# Patient Record
(demographics unavailable — no encounter records)

---

## 2024-10-23 NOTE — ASSESSMENT
[FreeTextEntry1] : 90F HTN, HLD, and reported asthma presenting for initial pulmonary evaluation.  #Asthma - PFTs reviewed with obstruction and significant bronchodilator response - Will add Prednisone 20mg daily x 7 days - Will start Breztri BID - sample provided in office and patient aware to rinse mouth after use - Continue Albuterol PRN - Depending on response to current therapy will potentially consider serologic workup for further evalution - Check CXR to evaluate for potential parenchymal lung disease  #Health Maintenance Spirometry: Reviewed  Smoking status: Never smoker  Pneumococcal vaccination status: Will need prior records [place] Sleepiness Scale: Not a sleep visit  #Follow-up in 1 month or sooner as needed - All questions answered  The above plan was discussed with SCOTT HARDEN  in detail. Patient verbalized understanding and agrees with plan as detailed above. Patient was provided education and counselling on current diagnosis/symptoms, diagnostic work up, treatment options and potential side effects of any prescribed therapy/therapies. SCOTT  was advised to call our clinic at [phone] for any new or worsening symptoms, or with any questions or concerns. In case of acute onset of respiratory symptoms or worsening presentation, patient was advised to present to nearest emergency room for further evaluation. SCOTT  expressed understanding and all questions/concerns were addressed.

## 2024-10-23 NOTE — HISTORY OF PRESENT ILLNESS
[TextBox_4] : 90F HTN, HLD, and reported asthma presenting for initial pulmonary evaluation. - She comes to the hospital with her caregiver Brigitte today. She is in good spirits today and denies any major concerns  She has a long history of asthma and presently takes Albuterol 4 times a day with improvement in her wheezing. She notes the wheezing is worse at nighttime. She has a cough without sputum production. She has not had any hemoptysis. SHe has not had any recent treatments with Prednisone or antibiotics. - She does not follow with a pulmonary physician and her medications are renewed by her PMD. Generally she does ok and notes a worsening of symptoms during the fall and winter time.  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 She has 12 children and multiple grandchildren. She has been in the [place] for > 50 years. She does not have any pets. She does not have any carpets in her house. She denies any recent hospitalizations or urgent care visits.  She denies fevers, chills, or night sweats. She denies weight loss. She denies limitation to her activity but does note that she is mostly sedentary and enjoys watching tv. - She comes to the office walking with a cane.   PFTs: 10/22/24 - FEV1 0.83L (68%), FVC 1.72L (111%), FEV1/FVC 48%, FEF 25-75 30%, TLC 93%, RV 79%, RV/TLC 50%, DLCO 56% - POSITIVE BD RESPONSE (16% FEV1 and 15% FVC)

## 2024-10-23 NOTE — PHYSICAL EXAM
[No Acute Distress] : no acute distress [Well Nourished] : well nourished [Well Groomed] : well groomed [No Deformities] : no deformities [Well Developed] : well developed [Normal Oropharynx] : normal oropharynx [Normal Appearance] : normal appearance [No Neck Mass] : no neck mass [Normal Rate/Rhythm] : normal rate/rhythm [Normal S1, S2] : normal s1, s2 [No Resp Distress] : no resp distress [Benign] : benign [Soft] : soft [Normal Gait] : normal gait [No Clubbing] : no clubbing [No Cyanosis] : no cyanosis [No Edema] : no edema [FROM] : FROM [Normal Color/ Pigmentation] : normal color/ pigmentation [No Focal Deficits] : no focal deficits [No Motor Deficits] : no motor deficits [Oriented x3] : oriented x3 [Normal Mood] : normal mood [Normal Affect] : normal affect [No Acc Muscle Use] : no acc muscle use [Normal Rhythm and Effort] : normal rhythm and effort [Wheeze] : wheeze [TextBox_11] : NCAT, EOMI, anicteric, MMM, nares clear, no thrush [TextBox_68] : faint expiratory wheeze R lung base

## 2024-10-23 NOTE — ASSESSMENT
[FreeTextEntry1] : 90F HTN, HLD, and reported asthma presenting for initial pulmonary evaluation.  #Asthma - PFTs reviewed with obstruction and significant bronchodilator response - Will add Prednisone 20mg daily x 7 days - Will start Breztri BID - sample provided in office and patient aware to rinse mouth after use - Continue Albuterol PRN - Depending on response to current therapy will potentially consider serologic workup for further evalution - Check CXR to evaluate for potential parenchymal lung disease  #Health Maintenance Spirometry: Reviewed  Smoking status: Never smoker  Pneumococcal vaccination status: Will need prior records [place] Sleepiness Scale: Not a sleep visit  #Follow-up in 1 month or sooner as needed - All questions answered  The above plan was discussed with SCOTT HARDEN  in detail. Patient verbalized understanding and agrees with plan as detailed above. Patient was provided education and counselling on current diagnosis/symptoms, diagnostic work up, treatment options and potential side effects of any prescribed therapy/therapies. SCOTT  was advised to call our clinic at [phone] for any new or worsening symptoms, or with any questions or concerns. In case of acute onset of respiratory symptoms or worsening presentation, patient was advised to present to nearest emergency room for further evaluation. SCOTT  expressed understanding and all questions/concerns were addressed.

## 2024-10-23 NOTE — REVIEW OF SYSTEMS
[Cough] : cough [Wheezing] : wheezing [SOB on Exertion] : sob on exertion [Arthralgias] : arthralgias [Fever] : no fever [Fatigue] : no fatigue [Chills] : no chills [Dry Eyes] : no dry eyes [Sore Throat] : no sore throat [Eye Irritation] : no eye irritation [Nasal Congestion] : no nasal congestion [Postnasal Drip] : no postnasal drip [Hemoptysis] : no hemoptysis [Chest Tightness] : no chest tightness [Frequent URIs] : no frequent URIs [Sputum] : no sputum [Dyspnea] : no dyspnea [Edema] : no edema [Leg Cramps] : no leg cramps [Itchy Eyes] : no itchy eyes [Seasonal Allergies] : no seasonal allergies [Abdominal Pain] : no abdominal pain [Nausea] : no nausea [Vomiting] : no vomiting [Myalgias] : no myalgias [Headache] : no headache [Focal Weakness] : no focal weakness [Seizures] : no seizures [Head Injury] : no head injury [Anxiety] : no anxiety [Diabetes] : no diabetes [Thyroid Problem] : no thyroid problem [Obesity] : no obesity

## 2025-04-28 NOTE — IMAGING
[Bilateral] : hand bilaterally [There are no fractures, subluxations or dislocations. No significant abnormalities are seen] : There are no fractures, subluxations or dislocations. No significant abnormalities are seen [Degenerative change] : Degenerative change

## 2025-04-28 NOTE — DISCUSSION/SUMMARY
[de-identified] : Discussed findings of today's exam and possible causes of patient's pain.  Educated patient on their most probable diagnosis of diffuse hand pain with numbness and tingling, possible atypical presentation of carpal tunnel syndrome, possible cervical radiculopathy. Reviewed possible courses of treatment, and we collaboratively decided best course of treatment at this time will include conservative measures. Patient sent MDP to address pain and symptoms. She will continue with topical rubs and wrist bracing. She will follow up in 1 week with Dr. Shanks for reevaluation or sooner if symptoms progress despite treatment. Patient and her aid expressed understanding and agreement with the plan.

## 2025-04-28 NOTE — HISTORY OF PRESENT ILLNESS
[8] : 8 [Burning] : burning [Localized] : localized [Sharp] : sharp [Shooting] : shooting [Stabbing] : stabbing [Throbbing] : throbbing [Tingling] : tingling [Constant] : constant [Nothing helps with pain getting better] : Nothing helps with pain getting better [de-identified] : 4/28/25: 90 BASIM LOFTON is here for bilateral hand pain that began without inciting injury. This is her first evaluation. She has used Tylenol, Ibuprofen and massage to treat it. She reports numbness throughout her hands and swelling of the right hand. She has tried a compression sleeve. Denies fever, chills, SOB, neck pain.  Patient was seen previously and was diagnosed with CTS and trigger finger.   PMH: HLD. HTN.  [] : no [FreeTextEntry5] : LEFT AND RIGHT WRIST/HAND [FreeTextEntry7] : BOTH hands and wrists

## 2025-04-28 NOTE — PHYSICAL EXAM
[Right] : right hand [Left] : left hand [] : no erythema [FreeTextEntry3] : trembling of right hand [FreeTextEntry9] : able to make loose fist

## 2025-05-13 NOTE — DISCUSSION/SUMMARY
[de-identified] : Discussed the nature of the diagnosis and risk and benefits of different modalities of treatment. B/l CTS RT middle & ring TF She will night every night  Rx provided Discussed conservative management as symptoms demand vs CSI. Pt would like a CSI for RT middle & ring TF Done today and tolerated well. All questions answered. RTO 4 weeks

## 2025-05-13 NOTE — HISTORY OF PRESENT ILLNESS
[de-identified] : 90 year old female with over 2 years bilateral hand pain, numbness and stiffness.  Denies trauma.  Sleep is interrupted.  Has been wearing splints at night. R>L . Constant numbness for 3 months.

## 2025-05-13 NOTE — PHYSICAL EXAM
[Right] : right hand [3rd] : 3rd [4th] : 4th [A1-Pulley] : A1-pulley [Left] : left hand [] : negative triggering [FreeTextEntry3] : L hand:  +thenar atrophy  Tender volar wrist  Good finger ROM  +Tinels  +Phalens  +Compression test  Decreased sensation median nerve distribution

## 2025-05-13 NOTE — PROCEDURE
[FreeTextEntry3] : Tendon sheath was performed of the right 3rd trigger finger. The indication for this procedure was pain and inflammation. The site was prepped with alcohol, ethyl chloride sprayed topically, and sterile technique used. An injection of Lidocaine 0.5cc of 1%, Triamcinolone (Kenalog) 0.5cc of 10 mg was used. Patient tolerated procedure well.   The risks, benefits, and alternatives have been discussed, and verbal consent was obtained.  Tendon sheath was performed of the right 3rd trigger finger. The indication for this procedure was pain and inflammation. The site was prepped with alcohol, ethyl chloride sprayed topically, and sterile technique used. An injection of Lidocaine 0.5cc of 1%, Triamcinolone (Kenalog) 0.5cc of 10 mg was used. Patient tolerated procedure well.   The risks, benefits, and alternatives have been discussed, and verbal consent was obtained.    Tendon sheath was performed of the right 4th trigger finger. The indication for this procedure was pain and inflammation. The site was prepped with alcohol, ethyl chloride sprayed topically, and sterile technique used. An injection of Lidocaine 0.5cc of 1%, Triamcinolone (Kenalog) 0.5cc of 10 mg was used.  Patient tolerated procedure well.   The risks, benefits, and alternatives have been discussed, and verbal consent was obtained.

## 2025-06-10 NOTE — DISCUSSION/SUMMARY
[de-identified] : Discussed the nature of the diagnosis and risk and benefits of different modalities of treatment. B/l CTS RT middle & ring TF LT middle trigger finger added to dx  She will splint 4 weeks, d/c 2 weeks  Discussed conservative management as symptoms demand vs CSI. Pt would like a CSI for LT middle TF  Done today and tolerated well. Should CSI fail to provide relief, pt will take OTC NSAID's and apply warm compresses. All questions answered. RTO 6 weeks

## 2025-06-10 NOTE — PROCEDURE
[FreeTextEntry3] : Tendon sheath was performed of the left 3rd trigger finger. The indication for this procedure was pain and inflammation. The site was prepped with alcohol, ethyl chloride sprayed topically, and sterile technique used. An injection of Lidocaine 0.5cc of 1%, Triamcinolone (Kenalog) 0.5cc of 10 mg was used. Patient tolerated procedure well.   The risks, benefits, and alternatives have been discussed, and verbal consent was obtained.

## 2025-06-10 NOTE — HISTORY OF PRESENT ILLNESS
[de-identified] : 90 year old female followed for b/l CTS & RT middle & ring TF. Pt is 4 weeks s/p RT middle & ring TF CSI. She has been splinting at night. she reports RT middle & ring are improved. She reports numbness & pain persists. Today she is c/o LT middle finger pain & triggering.   RT middle:  25 RT rin25

## 2025-07-29 NOTE — PROCEDURE
[FreeTextEntry3] : Tendon sheath was performed of the left 2nd trigger finger. The indication for this procedure was pain and inflammation. The site was prepped with alcohol, ethyl chloride sprayed topically, and sterile technique used. An injection of Lidocaine 0.5cc of 1%, Triamcinolone (Kenalog) 0.5cc of 10 mg was used. Patient tolerated procedure well.     The risks, benefits, and alternatives have been discussed, and verbal consent was obtained.

## 2025-07-29 NOTE — DISCUSSION/SUMMARY
[de-identified] : Discussed the nature of the diagnosis and risk and benefits of different modalities of treatment. B/l CTS RT middle & ring TF LT middle trigger finger  LT index TF added to dx  Discussed conservative management as symptoms demand vs CSI. Pt would like a CSI for LT index TF Done today and tolerated well. Should CSI fail to provide relief, pt will take OTC NSAID's and apply warm compresses. All questions answered. RTO 6 weeks

## 2025-07-29 NOTE — REASON FOR VISIT
[FreeTextEntry2] : Patient is here today for follow up BL hands, R>L. CSI (6/10) tolerated well but notes cont'd pain in the right hand. Experiencing n/t in tips of fingers. D/c splint after 4 weeks. Using Tylenol PRN. Pain level 8/10.

## 2025-07-29 NOTE — PHYSICAL EXAM
[Left] : left hand [FreeTextEntry3] : Mild left index A1 pulley tenderness Mild little finger A1 pulley tenderness

## 2025-07-29 NOTE — HISTORY OF PRESENT ILLNESS
[de-identified] : 90 year old female followed for  b/l CTS  & RT middle & ring TF LEFT middle trigger finger, injected at her last visit.  She has been splinting at night for her CTS, but removed the splint at my direction 2 weeks ago.  She reports improvement of symptoms.  RT middle:  25 RT rin25 LT middle 6/10/25